# Patient Record
Sex: FEMALE | Race: BLACK OR AFRICAN AMERICAN | NOT HISPANIC OR LATINO | Employment: UNEMPLOYED | URBAN - METROPOLITAN AREA
[De-identification: names, ages, dates, MRNs, and addresses within clinical notes are randomized per-mention and may not be internally consistent; named-entity substitution may affect disease eponyms.]

---

## 2024-09-23 ENCOUNTER — HOSPITAL ENCOUNTER (EMERGENCY)
Facility: HOSPITAL | Age: 59
Discharge: HOME | End: 2024-09-23

## 2024-09-23 VITALS
HEART RATE: 85 BPM | DIASTOLIC BLOOD PRESSURE: 90 MMHG | BODY MASS INDEX: 37.29 KG/M2 | OXYGEN SATURATION: 100 % | TEMPERATURE: 98.9 F | HEIGHT: 59 IN | RESPIRATION RATE: 18 BRPM | SYSTOLIC BLOOD PRESSURE: 185 MMHG | WEIGHT: 185 LBS

## 2024-09-23 DIAGNOSIS — I10 HYPERTENSION, UNSPECIFIED TYPE: ICD-10-CM

## 2024-09-23 DIAGNOSIS — M54.31 SCIATICA OF RIGHT SIDE: Primary | ICD-10-CM

## 2024-09-23 PROCEDURE — 2500000005 HC RX 250 GENERAL PHARMACY W/O HCPCS: Performed by: PHYSICIAN ASSISTANT

## 2024-09-23 PROCEDURE — 96372 THER/PROPH/DIAG INJ SC/IM: CPT | Performed by: PHYSICIAN ASSISTANT

## 2024-09-23 PROCEDURE — 2500000004 HC RX 250 GENERAL PHARMACY W/ HCPCS (ALT 636 FOR OP/ED): Performed by: PHYSICIAN ASSISTANT

## 2024-09-23 PROCEDURE — 99283 EMERGENCY DEPT VISIT LOW MDM: CPT

## 2024-09-23 PROCEDURE — 99284 EMERGENCY DEPT VISIT MOD MDM: CPT | Performed by: PHYSICIAN ASSISTANT

## 2024-09-23 RX ORDER — LIDOCAINE 50 MG/G
1 PATCH TOPICAL DAILY
Qty: 10 PATCH | Refills: 0 | Status: SHIPPED | OUTPATIENT
Start: 2024-09-23 | End: 2024-10-03

## 2024-09-23 RX ORDER — NAPROXEN 500 MG/1
500 TABLET ORAL
Qty: 14 TABLET | Refills: 0 | Status: SHIPPED | OUTPATIENT
Start: 2024-09-23 | End: 2024-09-30

## 2024-09-23 RX ORDER — ACETAMINOPHEN 325 MG/1
975 TABLET ORAL ONCE
Status: COMPLETED | OUTPATIENT
Start: 2024-09-23 | End: 2024-09-23

## 2024-09-23 RX ORDER — LIDOCAINE 560 MG/1
1 PATCH PERCUTANEOUS; TOPICAL; TRANSDERMAL ONCE
Status: DISCONTINUED | OUTPATIENT
Start: 2024-09-23 | End: 2024-09-24 | Stop reason: HOSPADM

## 2024-09-23 RX ORDER — ACETAMINOPHEN 325 MG/1
650 TABLET ORAL EVERY 6 HOURS PRN
Qty: 20 TABLET | Refills: 0 | Status: SHIPPED | OUTPATIENT
Start: 2024-09-23 | End: 2024-09-28

## 2024-09-23 RX ORDER — KETOROLAC TROMETHAMINE 30 MG/ML
30 INJECTION, SOLUTION INTRAMUSCULAR; INTRAVENOUS ONCE
Status: COMPLETED | OUTPATIENT
Start: 2024-09-23 | End: 2024-09-23

## 2024-09-23 ASSESSMENT — PAIN DESCRIPTION - LOCATION
LOCATION: LEG
LOCATION: LEG

## 2024-09-23 ASSESSMENT — PAIN SCALES - GENERAL
PAINLEVEL_OUTOF10: 10 - WORST POSSIBLE PAIN
PAINLEVEL_OUTOF10: 10 - WORST POSSIBLE PAIN

## 2024-09-23 ASSESSMENT — PAIN - FUNCTIONAL ASSESSMENT
PAIN_FUNCTIONAL_ASSESSMENT: 0-10
PAIN_FUNCTIONAL_ASSESSMENT: 0-10

## 2024-09-23 ASSESSMENT — PAIN DESCRIPTION - PAIN TYPE: TYPE: ACUTE PAIN

## 2024-09-23 ASSESSMENT — COLUMBIA-SUICIDE SEVERITY RATING SCALE - C-SSRS
6. HAVE YOU EVER DONE ANYTHING, STARTED TO DO ANYTHING, OR PREPARED TO DO ANYTHING TO END YOUR LIFE?: NO
1. IN THE PAST MONTH, HAVE YOU WISHED YOU WERE DEAD OR WISHED YOU COULD GO TO SLEEP AND NOT WAKE UP?: NO
2. HAVE YOU ACTUALLY HAD ANY THOUGHTS OF KILLING YOURSELF?: NO

## 2024-09-23 ASSESSMENT — PAIN DESCRIPTION - ORIENTATION: ORIENTATION: RIGHT

## 2024-09-23 NOTE — Clinical Note
Michelle Desai was seen and treated in our emergency department on 9/23/2024.  She may return to work on 09/25/2024.       If you have any questions or concerns, please don't hesitate to call.      Patience Goetz PA-C

## 2024-09-24 NOTE — ED PROVIDER NOTES
"This is a 59-year-old female past medical history of hypertension as well as sciatica who presents to the ED with concern for a sciatica flareup.  She states that 3 days ago she began having pain to the lateral aspect of her right leg that runs down the side of her leg to just above her ankle.  She states that she has had pain like this in the past with her sciatica.  She denies any associated weakness, paresthesias or areas of decree sensation.  She denies any episodes of bowel or bladder incontinence or saddle anesthesia.  She does endorse having some right-sided low back pain.  She denies any abdominal pain, chest pain, shortness of breath, fevers, chills, anticoagulation use, IV drug use, headaches, or visual changes.  She denies any recent trauma or injury.                Visit Vitals  BP (!) 189/103   Pulse 91   Temp 37.2 °C (98.9 °F)   Resp 18   Ht 1.499 m (4' 11\")   Wt 83.9 kg (185 lb)   SpO2 99%   BMI 37.37 kg/m²   BSA 1.87 m²          Physical Exam     Physical exam:   General: Vitals noted, no distress. Afebrile.   EENT:  Hearing grossly intact. Normal phonation. MMM. Airway patient. PERRL. EOMI.   Neck: No midline tenderness or paraspinal tenderness. FROM.   Cardiac: Regular, rate, rhythm. Normal S1 and S2.  No murmurs, gallops, rubs.   Pulmonary: Good air exchange. Lungs clear bilaterally. No wheezes, rhonchi, rales. No accessory muscle use.   Abdomen: Soft, nonsurgical. Nontender. No peritoneal signs. Normoactive bowel sounds.   Back: No CVA tenderness. No midline tenderness or paraspinal tenderness. No obvious deformity.   Extremities: No peripheral edema.  Full range of motion. Moves all extremities freely.  Tender to palpation to the right lateral thigh without any obvious deformity.  Skin: No rash. Warm and Dry.   Neuro: No focal neurologic deficits. CN 2-12 grossly intact. Sensation equal bilaterally. No weakness.         Labs Reviewed - No data to display    No orders to display           ED " Course & MDM     Medical Decision Making  This is a 59-year-old female who presents to the ED with lower committee pain that feels similar to her previous sciatica.  Vitals that show she was hypertensive at 189/103.  She denied any chest pain, shortness of breath, abdominal pain or headache.  She states that she has been told in the past that she has hypertension, however does not take any medication for this and does not follow with a primary care provider.  On examination she is neurologically intact without deficits.  Specifically patient has full strength and sensation to bilateral lower extremities.  She denies any episodes of bowel or bladder incontinence or saddle anesthesia.  No reproducible midline low back pain.  She denied IV drug use, blood thinner use or personal history of cancer.  No recent falls or injuries.  No red flags for this leg pain.  Based on patient's description of her symptoms as well as her normal neurologic examination emergent MRI not indicated at this point in time.  Patient medicated with Tylenol, Toradol, and lidocaine patch for her symptoms.  Concerning patient's blood pressure as she has no symptoms of this at this time she was advised to follow-up as an outpatient with her primary care provider.  She was given signs and symptoms that she should return to the ED with concerning this blood pressure.  On my reassessment patient was feeling improved and states that her pain had significantly decreased.  She felt comfortable being discharged home at this time.  She was written prescriptions for lidocaine patches, Tylenol and naproxen for her symptoms at home.  She was provided with primary care follow-up information for her sciatica as well as her elevated blood pressure today.  She was agreeable she was given strict return precautions and was discharged from emergency department in stable condition.    Risk  Prescription drug management.         Diagnoses as of 09/23/24 2001    Sciatica of right side   Hypertension, unspecified type       Patient was seen independently    Procedures    NELY James, MAGGIE Goetz PA-C  09/23/24 [id number]

## 2024-09-24 NOTE — ED TRIAGE NOTES
Patient presents to the ED for right leg pain, patient states she is having a sciatica flair up. Denies traumatic injury